# Patient Record
Sex: FEMALE | Race: WHITE
[De-identification: names, ages, dates, MRNs, and addresses within clinical notes are randomized per-mention and may not be internally consistent; named-entity substitution may affect disease eponyms.]

---

## 2018-08-06 ENCOUNTER — HOSPITAL ENCOUNTER (EMERGENCY)
Dept: HOSPITAL 97 - ER | Age: 28
Discharge: HOME | End: 2018-08-06
Payer: COMMERCIAL

## 2018-08-06 DIAGNOSIS — F17.210: ICD-10-CM

## 2018-08-06 DIAGNOSIS — J06.9: Primary | ICD-10-CM

## 2018-08-06 PROCEDURE — 87070 CULTURE OTHR SPECIMN AEROBIC: CPT

## 2018-08-06 PROCEDURE — 87804 INFLUENZA ASSAY W/OPTIC: CPT

## 2018-08-06 PROCEDURE — 87081 CULTURE SCREEN ONLY: CPT

## 2018-08-06 PROCEDURE — 99283 EMERGENCY DEPT VISIT LOW MDM: CPT

## 2018-08-06 NOTE — EDPHYS
Physician Documentation                                                                           

 [facility]                                                                

Name: Vincenzo Kim                                                                              

Age: 28 yrs                                                                                       

Sex: Female                                                                                       

: 1990                                                                                   

MRN: [id number]                                                                                   

Arrival Date: 2018                                                                          

Time: 10:59                                                                                       

Account#: [id number]                                                                            

Bed 15                                                                                            

Private MD: None, None                                                                            

ED Physician Gurwinder Dunlap                                                                       

HPI:                                                                                              

                                                                                             

12:27 This 28 yrs old  Female presents to ER via Ambulatory with complaints of       gs  

      Cough, Congestion, Sore Throat.                                                             

12:27 Onset: The symptoms/episode began/occurred 3 day(s) ago. Severity of symptoms: At their gs  

      worst the symptoms were moderate, in the emergency department the symptoms have             

      improved, mildly. Modifying factors: the symptoms are aggravated by smoke. Associated       

      signs and symptoms: Pertinent positives: fever, sore throat. The patient has                

      experienced similar episodes in the past, a few times. The patient has not recently         

      seen a physician.                                                                           

                                                                                                  

OB/GYN:                                                                                           

11:58 LMP N/A - .                                                                             tw2 

                                                                                                  

Historical:                                                                                       

- Allergies:                                                                                      

11:10 No Known Allergies;                                                                     tw2 

- Home Meds:                                                                                      

11:10 Adipex-P 37.5 mg oral cap 1 cap once daily [Active];                                    tw2 

- PMHx:                                                                                           

11:10 Gout;                                                                                   tw2 

- PSHx:                                                                                           

11:10 None;                                                                                   tw2 

                                                                                                  

- Immunization history:: Adult Immunizations up to date.                                          

- Social history:: Smoking status: Patient uses tobacco products, smokes one-half pack            

  cigarettes per day.                                                                             

- Ebola Screening: : Patient denies travel to an Ebola-affected area in the 21 days               

  before illness onset.                                                                           

                                                                                                  

                                                                                                  

ROS:                                                                                              

12:27 All other systems are negative.                                                         gs  

                                                                                                  

Exam:                                                                                             

12:27 Head/Face:  Normocephalic, atraumatic. Eyes:  Pupils equal round and reactive to light, gs  

      extra-ocular motions intact.  Lids and lashes normal.  Conjunctiva and sclera are           

      non-icteric and not injected.  Cornea within normal limits.  Periorbital areas with no      

      swelling, redness, or edema. Neck:  Trachea midline, no thyromegaly or masses palpated,     

      and no cervical lymphadenopathy.  Supple, full range of motion without nuchal rigidity,     

      or vertebral point tenderness.  No Meningismus. Chest/axilla:  Normal chest wall            

      appearance and motion.  Nontender with no deformity.  No lesions are appreciated.           

      Cardiovascular:  Regular rate and rhythm with a normal S1 and S2.  No gallops, murmurs,     

      or rubs.  Normal PMI, no JVD.  No pulse deficits. Respiratory:  Lungs have equal breath     

      sounds bilaterally, clear to auscultation and percussion.  No rales, rhonchi or wheezes     

      noted.  No increased work of breathing, no retractions or nasal flaring. Abdomen/GI:        

      Soft, non-tender, with normal bowel sounds.  No distension or tympany.  No guarding or      

      rebound.  No evidence of tenderness throughout. Back:  No spinal tenderness.  No            

      costovertebral tenderness.  Full range of motion. Skin:  Warm, dry with normal turgor.      

      Normal color with no rashes, no lesions, and no evidence of cellulitis. MS/ Extremity:      

      Pulses equal, no cyanosis.  Neurovascular intact.  Full, normal range of motion. Neuro:     

       Awake and alert, GCS 15, oriented to person, place, time, and situation.  Cranial          

      nerves II-XII grossly intact.  Motor strength 5/5 in all extremities.  Sensory grossly      

      intact.  Cerebellar exam normal.  Normal gait.                                              

12:27 Constitutional: The patient appears alert, awake.                                           

12:27 ENT: TM's: are normal, Nose: is normal, Posterior pharynx: swelling, is not                 

      appreciated, erythema, that is mild, Voice: is normal.                                      

                                                                                                  

Vital Signs:                                                                                      

11:05  / 102; Pulse 99; Resp 17; Temp 99(O); Pulse Ox 99% ;                             tw2 

11:58  / 89; Pulse 94; Resp 17; Pulse Ox 100% on R/A;                                   tw2 

                                                                                                  

MDM:                                                                                              

11:34 Patient medically screened.                                                               

12:27 Differential Diagnosis: Bronchitis Influenza Upper Respiratory Infection. Data            

      reviewed: vital signs, nurses notes. Response to treatment: the patient's symptoms have     

      mildly improved after treatment, and as a result, I will discharge patient.                 

12:43 Counseling: I had a detailed discussion with the patient and/or guardian regarding: the   

      historical points, exam findings, and any diagnostic results supporting the                 

      discharge/admit diagnosis, lab results, the need for outpatient follow up.                  

                                                                                                  

                                                                                             

11:35 Order name: Strep; Complete Time: 12:43                                                   

                                                                                             

11:35 Order name: Flu; Complete Time: 12:43                                                     

                                                                                             

12:44 Order name: Throat Culture                                                              EDMS

                                                                                                  

Administered Medications:                                                                         

No medications were administered                                                                  

                                                                                                  

                                                                                                  

Disposition:                                                                                      

18 12:45 Discharged to Home. Impression: Acute upper respiratory infection, unspecified.    

- Condition is Stable.                                                                            

- Discharge Instructions: Upper Respiratory Infection, Adult, Managing Your                       

  Hypertension.                                                                                   

                                                                                                  

- Work release form, Medication Reconciliation Form, Thank You Letter, Antibiotic                 

  Education, Prescription Opioid Use form.                                                        

- Follow up: Private Physician; When: 2 - 3 days; Reason: Re-evaluation by your                   

  physician.                                                                                      

                                                                                                  

                                                                                                  

                                                                                                  

Signatures:                                                                                       

Dispatcher [employer]                           Terri Xie RN RN   aj                                                   

Aida Benedict RN                          RN   tw2                                                  

Gurwinder Dunlap MD MD gs                                                   

                                                                                                  

Corrections: (The following items were deleted from the chart)                                    

13:05 12:45 2018 12:45 Discharged to Home. Impression: Acute upper respiratory          aj  

      infection, unspecified. Condition is Stable. Forms are Medication Reconciliation Form,      

      Thank You Letter, Antibiotic Education, Prescription Opioid Use. Follow up: Private         

      Physician; When: 2 - 3 days; Reason: Re-evaluation by your physician. gs                    

                                                                                                  

**************************************************************************************************

## 2018-08-06 NOTE — ER
Nurse's Notes                                                                                     

 [facility]                                                                

Name: Vincenzo Kim                                                                              

Age: 28 yrs                                                                                       

Sex: Female                                                                                       

: 1990                                                                                   

MRN: [id number]                                                                                   

Arrival Date: 2018                                                                          

Time: 10:59                                                                                       

Account#: [id number]                                                                            

Bed 15                                                                                            

Private MD: None, None                                                                            

Diagnosis: Acute upper respiratory infection, unspecified                                         

                                                                                                  

Presentation:                                                                                     

                                                                                             

11:05 Presenting complaint: Patient states: cough, nasal congestion and ears popping for 3    tw2 

      days. Transition of care: patient was not received from another setting of care. Onset      

      of symptoms was 2018. Risk Assessment: Do you want to hurt yourself or           

      someone else? Patient reports no desire to harm self or others. Initial Sepsis Screen:      

      Does the patient meet any 2 criteria? No. Patient's initial sepsis screen is negative.      

      Does the patient have a suspected source of infection? No. Patient's initial sepsis         

      screen is negative. Care prior to arrival: None.                                            

11:05 Method Of Arrival: Ambulatory                                                           tw2 

11:05 Acuity: NO 4                                                                           tw2 

                                                                                                  

Triage Assessment:                                                                                

13:00 General: Behavior is calm.                                                              tw2 

                                                                                                  

OB/GYN:                                                                                           

11:58 LMP N/A - .                                                                             tw2 

                                                                                                  

Historical:                                                                                       

- Allergies:                                                                                      

11:10 No Known Allergies;                                                                     tw2 

- Home Meds:                                                                                      

11:10 Adipex-P 37.5 mg oral cap 1 cap once daily [Active];                                    tw2 

- PMHx:                                                                                           

11:10 Gout;                                                                                   tw2 

- PSHx:                                                                                           

11:10 None;                                                                                   tw2 

                                                                                                  

- Immunization history:: Adult Immunizations up to date.                                          

- Social history:: Smoking status: Patient uses tobacco products, smokes one-half pack            

  cigarettes per day.                                                                             

- Ebola Screening: : Patient denies travel to an Ebola-affected area in the 21 days               

  before illness onset.                                                                           

                                                                                                  

                                                                                                  

Screenin:08 Abuse screen: Denies threats or abuse. Nutritional screening: No deficits noted.        tw2 

      Tuberculosis screening: No symptoms or risk factors identified. Fall Risk None              

      identified.                                                                                 

                                                                                                  

Assessment:                                                                                       

11:07 General: Appears in no apparent distress. Pain: Complains of pain in face. Neuro: Level tw2 

      of Consciousness is awake, alert, obeys commands, Oriented to person, place, time,          

      situation. Cardiovascular: Denies chest pain, shortness of breath, Capillary refill         

      Patient's skin is warm and dry. Respiratory: Reports cough that is non-productive,          

      Airway is patent Respiratory effort is even, unlabored, Respiratory pattern is regular,     

      symmetrical. GI: No signs and/or symptoms were reported involving the gastrointestinal      

      system. GI: Patient currently denies nausea, vomiting. : No signs and/or symptoms         

      were reported regarding the genitourinary system. EENT: Reports nasal congestion ears       

      popping. Derm: No signs and/or symptoms reported regarding the dermatologic system.         

      Musculoskeletal: No signs and/or symptoms reported regarding the musculoskeletal            

      system. Range of motion: intact in all extremities.                                         

                                                                                                  

Vital Signs:                                                                                      

11:05  / 102; Pulse 99; Resp 17; Temp 99(O); Pulse Ox 99% ;                             tw2 

11:58  / 89; Pulse 94; Resp 17; Pulse Ox 100% on R/A;                                   tw2 

                                                                                                  

ED Course:                                                                                        

10:59 Patient arrived in ED.                                                                  mr  

11:00 None, None is Private Physician.                                                        mr  

11:04 Aida Benedict, RN is Primary Nurse.                                                        tw2 

11:05 Triage completed.                                                                       tw2 

11:06 Gurwinder Dunlap MD is Attending Physician.                                                

11:07 Arm band placed on.                                                                     tw2 

11:09 Call light in reach. Pulse ox on. NIBP on.                                              tw2 

12:07 Flu Sent.                                                                               5 

12:08 Strep Sent.                                                                             5 

12:08 Flu and/or RSV swab sent to lab. Strep swab sent to lab.                                [facility] 

13:04 No provider procedures requiring assistance completed. Patient did not have IV access   aj  

      during this emergency room visit.                                                           

                                                                                                  

Administered Medications:                                                                         

No medications were administered                                                                  

                                                                                                  

                                                                                                  

Outcome:                                                                                          

12:45 Discharge ordered by MD.                                                                  

13:04 Discharged to home ambulatory, with family.                                               

13:04 Condition: good                                                                             

13:04 Discharge instructions given to patient, Instructed on discharge instructions,              

      Demonstrated understanding of instructions, follow-up care.                                 

13:05 Patient left the ED.                                                                    aj  

                                                                                                  

Signatures:                                                                                       

Terri Weathers, RN                       Radha Sheldon                                mr                                                   

Aida Benedict RN RN   [facility]                                                  

Radha March                              Gurwinder Pate MD MD                                                      

                                                                                                  

**************************************************************************************************